# Patient Record
Sex: MALE | Race: WHITE | URBAN - METROPOLITAN AREA
[De-identification: names, ages, dates, MRNs, and addresses within clinical notes are randomized per-mention and may not be internally consistent; named-entity substitution may affect disease eponyms.]

---

## 2023-06-01 ENCOUNTER — HOSPITAL ENCOUNTER (OUTPATIENT)
Dept: CT IMAGING | Age: 48
Discharge: HOME | End: 2023-06-01
Attending: FAMILY MEDICINE

## 2023-06-01 DIAGNOSIS — Z13.6 SCREENING FOR HEART DISEASE: ICD-10-CM

## 2023-06-01 NOTE — PROGRESS NOTES
Date of Service 6/1/2023    Petty Patel  Date of Birth 1/19/1975    Patient Age: 50year old    PCP: Justin Tipton  [id number] [place] [address]    Consult Type  Type Scan/Screening: Heart Scan  Preliminary Heart Scan Score: 0                Body Mass Index  There is no height or weight on file to calculate BMI. Lipid Profile  No Lipid results on file in last 5 years . 2/24/2023  Total - 686  LDL - 550  HDL - 94  Triglycerides - 212    Patient just started having cholesterol checked in the last 3 years. States he had blood work done years ago at [facility], including lipids and will look back and check those results. States past 3 years his LDL and Triglycerides have ran extremely high. He does not have sugars or alcohol, but a diet very high in red meat. He is not on cholesterol med. Nurse Review  Risk factor information and results reviewed with Nurse: Yes     We discussed following up with a Cardiologist about his lipids, especially the LDL. Very good that his Preliminary Heart Scan Calcium Score is 0.0. This is a Calcium Score. Plaqlue comes first and then calcium. The Heart Scan can not  on Soft Plaque. Recommended Follow Up:  Consult your physician regarding[de-identified] Final Heart Scan Report; Discuss potential for Incidental Finding    No data recorded      Recommendations for Change:  Nutrition Changes: Low Saturated Fat; Increase Fiber  Cholesterol Modification (goal of therapy depends upon your risk): Decrease LDL (Lousy/Bad) Ideal <100;Decrease Triglycerides (Ugly) Normal <150     Smoking Cessation: No Change Needed  Weight Management: Maintain Current Weight  Stress Management: Adopt Stress Management Techniques  Repeat Heart Scan: 5 years if Calcium Score is 0. 0; Discuss with your Physician          Janes Recommended Resources:  Recommended Resources: Upcoming Classes, Medical Services and Health Library [email or web address]. Graeme Hatchet, RN        Please Contact the Nurse Heart Line with any Questions or Concerns [phone].

## 2024-09-09 ENCOUNTER — LAB REQUISITION (OUTPATIENT)
Dept: SURGERY | Age: 49
End: 2024-09-09

## 2024-09-09 DIAGNOSIS — M86.642: ICD-10-CM

## 2024-09-09 DIAGNOSIS — M25.742 OSTEOPHYTE, LEFT HAND: ICD-10-CM

## 2024-09-09 PROCEDURE — 88305 TISSUE EXAM BY PATHOLOGIST: CPT | Performed by: CLINICAL MEDICAL LABORATORY

## 2024-09-09 PROCEDURE — 88311 DECALCIFY TISSUE: CPT | Performed by: CLINICAL MEDICAL LABORATORY

## 2024-09-13 LAB
ASR DISCLAIMER: NORMAL
CASE RPRT: NORMAL
CLINICAL INFO: NORMAL
PATH REPORT.FINAL DX SPEC: NORMAL
PATH REPORT.FINAL DX SPEC: NORMAL
PATH REPORT.GROSS SPEC: NORMAL